# Patient Record
Sex: FEMALE | Race: BLACK OR AFRICAN AMERICAN | NOT HISPANIC OR LATINO | Employment: FULL TIME | URBAN - METROPOLITAN AREA
[De-identification: names, ages, dates, MRNs, and addresses within clinical notes are randomized per-mention and may not be internally consistent; named-entity substitution may affect disease eponyms.]

---

## 2020-07-26 ENCOUNTER — HOSPITAL ENCOUNTER (EMERGENCY)
Facility: OTHER | Age: 35
Discharge: HOME | End: 2020-07-26
Attending: EMERGENCY MEDICINE
Payer: COMMERCIAL

## 2020-07-26 VITALS
RESPIRATION RATE: 18 BRPM | TEMPERATURE: 99 F | BODY MASS INDEX: 37.67 KG/M2 | HEIGHT: 67 IN | DIASTOLIC BLOOD PRESSURE: 87 MMHG | OXYGEN SATURATION: 98 % | WEIGHT: 240 LBS | SYSTOLIC BLOOD PRESSURE: 148 MMHG | HEART RATE: 81 BPM
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
## 2020-07-26 DIAGNOSIS — M79.606 LEG PAIN: ICD-10-CM

## 2020-07-26 DIAGNOSIS — M25.571 RIGHT ANKLE PAIN: Primary | ICD-10-CM

## 2020-07-26 PROCEDURE — 25000003 PHARM REV CODE 250: Performed by: EMERGENCY MEDICINE

## 2020-07-26 PROCEDURE — 99283 EMERGENCY DEPT VISIT LOW MDM: CPT | Mod: 25

## 2020-07-26 RX ORDER — HYDROCODONE BITARTRATE AND ACETAMINOPHEN 5; 325 MG/1; MG/1
1 TABLET ORAL EVERY 4 HOURS PRN
Qty: 15 TABLET | Refills: 0 | Status: SHIPPED | OUTPATIENT
Start: 2020-07-26

## 2020-07-26 RX ORDER — HYDROCODONE BITARTRATE AND ACETAMINOPHEN 5; 325 MG/1; MG/1
1 TABLET ORAL
Status: COMPLETED | OUTPATIENT
Start: 2020-07-26 | End: 2020-07-26

## 2020-07-26 RX ORDER — ACETAMINOPHEN 500 MG
1000 TABLET ORAL
Status: DISCONTINUED | OUTPATIENT
Start: 2020-07-26 | End: 2020-07-26

## 2020-07-26 RX ADMIN — HYDROCODONE BITARTRATE AND ACETAMINOPHEN 1 TABLET: 5; 325 TABLET ORAL at 05:07

## 2020-07-26 NOTE — ED NOTES
Pt to ED via EMS with c/o R knee and ankle pain. Pt reports wrestling with  and fell on leg bending it. Pt tearful. Pt reports R knee and lower shin pain. Pulses present. Full sensation noted. Pt reports unable to bare weight on R leg. Stabilization device on R leg. Pt denies n/v/d, SOB, CP. AAOx4. RR even, unlabored. Pt attached to pulse ox and BP cycled. Will continue to monitor.

## 2020-07-26 NOTE — ED PROVIDER NOTES
Encounter Date: 7/26/2020    SCRIBE #1 NOTE: I, Deepa Desouza, am scribing for, and in the presence of, Dr. Wiseman.       History     Chief Complaint   Patient presents with    Ankle Pain     +R ankle pain after trip and fall while at hotel, denies LOC or head trauma. Mild swelling to R ankle, 2+ pulses, cap refill <3 sec, sensation intact.     Knee Pain     +Pt also reporting R knee pain after fall, no obvious deformity noted. Unable to bear weight to RLE secondary to pain.      Time seen by provider: 5:03 PM    This is a 34 y.o. female who presents with complaint of right ankle pain s/p injury that occurred PTA. Patient states she was wrestling with her  and tripped backwards. She denies hitting her head or LOC. She states that she twisted her right leg during the fall. Patient reports pain from her right knee extending down to her ankle. She states she is unable to bear weight and is currently in an immobilizer. Patient denies taking any OTC medications. Patient denies any numbness or weakness. She denies any other injuries.    The history is provided by the patient.     Review of patient's allergies indicates:  No Known Allergies  History reviewed. No pertinent past medical history.  History reviewed. No pertinent surgical history.  No family history on file.  Social History     Tobacco Use    Smoking status: Never Smoker   Substance Use Topics    Alcohol use: Yes     Comment: socially    Drug use: Never     Review of Systems   Constitutional: Negative for activity change, appetite change, chills, diaphoresis and fever.   HENT: Negative for congestion, sore throat and trouble swallowing.    Eyes: Negative for photophobia and visual disturbance.   Respiratory: Negative for cough, chest tightness and shortness of breath.    Cardiovascular: Negative for chest pain and leg swelling.   Gastrointestinal: Negative for abdominal pain, nausea and vomiting.   Endocrine: Negative for polydipsia and  polyuria.   Genitourinary: Negative for difficulty urinating, dysuria and flank pain.   Musculoskeletal: Positive for arthralgias and gait problem. Negative for back pain and neck pain.        Positive for right leg pain.   Skin: Negative for rash.   Neurological: Negative for dizziness, syncope, weakness, light-headedness, numbness and headaches.   Hematological: Does not bruise/bleed easily.   Psychiatric/Behavioral: Negative for confusion.       Physical Exam     Initial Vitals [07/26/20 1654]   BP Pulse Resp Temp SpO2   137/77 82 14 99 °F (37.2 °C) 98 %      MAP       --         Physical Exam    Nursing note and vitals reviewed.  Constitutional: She appears well-developed. She is cooperative.   HENT:   Head: Atraumatic.   Eyes: Conjunctivae and lids are normal.   Neck: Normal range of motion and phonation normal.   Cardiovascular: Normal rate.   Musculoskeletal: Tenderness present.      Comments: Right lower extremity in temporary splint. ROM not accessed. Tenderness to medial malleolus. Normal distal perfusion.   Neurological: She is alert.   Skin: No rash noted.   Psychiatric: She has a normal mood and affect. Her speech is normal and behavior is normal.         ED Course   Procedures  Labs Reviewed   POCT URINE PREGNANCY          Imaging Results          X-Ray Ankle Complete Right (Final result)  Result time 07/26/20 18:49:57    Final result by Robi Prieto MD (07/26/20 18:49:57)                 Impression:      1. Well corticated irregularity about the distal posterior aspect of the tibia, correlation with any history of prior injury recommended as there is no overlying edema at this time.  Correlation with focal tenderness is advised.      Electronically signed by: Robi Prieto MD  Date:    07/26/2020  Time:    18:49             Narrative:    EXAMINATION:  XR ANKLE COMPLETE 3 VIEW RIGHT    CLINICAL HISTORY:  Pain in right ankle and joints of right foot    TECHNIQUE:  AP, lateral, and oblique images  of the right ankle were performed.    COMPARISON:  None    FINDINGS:  Three views right ankle.    There is a well corticated focus of ossific irregularity along the posterior aspect of the distal tibia, best seen on lateral view.  No overlying edema however correlation with any focal tenderness is recommended.  This may reflect sequela of prior injury.  No dislocation.  The ankle mortise is intact.                               X-Ray Tibia Fibula 2 View Right (Final result)  Result time 07/26/20 18:52:21    Final result by Robi Prieto MD (07/26/20 18:52:21)                 Impression:      1. Details of the distal tibia and fibula are detailed on the ankle radiograph however on current projection, there is again irregularity involving the posterior aspect of the distal tibia.  Portions appear corticated however more acute avulsion injury is not excluded particularly given current positioning as compared to the ankle radiograph.  Correlation with any focal tenderness is recommended.  2. No findings to suggest proximal tibial or fibular fracture.      Electronically signed by: Robi Prieto MD  Date:    07/26/2020  Time:    18:52             Narrative:    EXAMINATION:  XR TIBIA FIBULA 2 VIEW RIGHT    CLINICAL HISTORY:  Pain in leg, unspecified    TECHNIQUE:  AP and lateral views of the right tibia and fibula were performed.    COMPARISON:  None.    FINDINGS:  Two views tibia fibula.    In comparison to the previously performed ankle radiograph, there is cortical irregularity involving the distal posterior aspect of the tibia, on current projection, appears more angulated and may reflect that of acute injury.  This appeared more corticated and chronic appearing on the prior exam, possibly related to positioning.  No acute displaced fracture or dislocation of the proximal tibia or fibula.  The visualized portions of the knee appear intact.                               X-Ray Knee 1 or 2 View Right (Final  result)  Result time 07/26/20 18:39:53    Final result by Robi Prieto MD (07/26/20 18:39:53)                 Impression:      1. No acute displaced fracture or dislocation of the knee.      Electronically signed by: Robi Prieto MD  Date:    07/26/2020  Time:    18:39             Narrative:    EXAMINATION:  XR KNEE 1 OR 2 VIEW RIGHT    CLINICAL HISTORY:  Unspecified fall, initial encounter    TECHNIQUE:  AP and lateral views of the right knee were performed.    COMPARISON:  None    FINDINGS:  Two views right knee.    No acute displaced fracture or dislocation of the knee.  No radiopaque foreign body.  No large knee joint effusion.                              X-Rays:   Independently Interpreted Readings:   Other Readings:  X-Ray  Right Knee: Normal alignment. No obvious fractures or dislocation.  Right Tibia/Fibula: Normal alignment. No obvious fractures or dislocation.  Right ankle: Normal alignment. No obvious fractures or dislocation.    Medical Decision Making:   History:   Old Medical Records: I decided to obtain old medical records.  Initial Assessment:   Urgent evaluation of 34-year-old female here with pain to the right ankle status post fall.  Patient reports she was wrestling with her , when her knee was fold beneath her causing pain to the lower leg.  Patient is not take medication prior to arrival.  Patient is in splint by EMS.  Patient has normal distal neurovascular exam, range of motion not assessed.  No proximal tibial tenderness.  Will plan to obtain imaging and reassess.  Independently Interpreted Test(s):   I have ordered and independently interpreted X-rays - see prior notes.  Clinical Tests:   Radiological Study: Ordered and Reviewed  ED Management:  Small irregularity to distal tibia- given overlying tenderness will opt to immobilize and treat as fx. Pt placed in boot, educated regarding non weigh bearing on crutches until seen by Ortho, RICE education discussed.              Scribe Attestation:   Scribe #1: I performed the above scribed service and the documentation accurately describes the services I performed. I attest to the accuracy of the note.    Attending Attestation:           Physician Attestation for Scribe:  Physician Attestation Statement for Scribe #1: I, Dr. Wiseman, reviewed documentation, as scribed by Deepa Desouza in my presence, and it is both accurate and complete.                               Clinical Impression:     1. Right ankle pain    2. Leg pain    3. Fall            Disposition:   Disposition: Discharged  Condition: Fair     ED Disposition Condition    Discharge Stable        ED Prescriptions     Medication Sig Dispense Start Date End Date Auth. Provider    HYDROcodone-acetaminophen (NORCO) 5-325 mg per tablet Take 1 tablet by mouth every 4 (four) hours as needed for Pain. 15 tablet 7/26/2020  Em Wiseman MD        Follow-up Information     Follow up With Specialties Details Why Contact Info    [facility] Orth[facility] Surgery Go in 1 week  [address] [address]  [phone]                                       Em Wiseman MD  07/26/20 2029

## 2021-06-07 ENCOUNTER — HOSPITAL ENCOUNTER (OUTPATIENT)
Dept: HOSPITAL 5 - TRG | Age: 36
LOS: 1 days | Discharge: HOME | End: 2021-06-08
Attending: OBSTETRICS & GYNECOLOGY
Payer: COMMERCIAL

## 2021-06-07 VITALS — SYSTOLIC BLOOD PRESSURE: 118 MMHG | DIASTOLIC BLOOD PRESSURE: 64 MMHG

## 2021-06-07 DIAGNOSIS — Z3A.32: ICD-10-CM

## 2021-06-07 PROCEDURE — 59025 FETAL NON-STRESS TEST: CPT

## 2021-06-07 PROCEDURE — 96360 HYDRATION IV INFUSION INIT: CPT

## 2021-06-07 PROCEDURE — 76815 OB US LIMITED FETUS(S): CPT

## 2021-06-07 PROCEDURE — 96361 HYDRATE IV INFUSION ADD-ON: CPT

## 2021-06-07 PROCEDURE — 76819 FETAL BIOPHYS PROFIL W/O NST: CPT

## 2021-06-07 PROCEDURE — 81001 URINALYSIS AUTO W/SCOPE: CPT

## 2021-06-08 LAB
BILIRUB UR QL STRIP: (no result)
BLOOD UR QL VISUAL: (no result)
MUCOUS THREADS #/AREA URNS HPF: (no result) /HPF
PH UR STRIP: 6 [PH] (ref 5–7)
RBC #/AREA URNS HPF: 3 /HPF (ref 0–6)
UROBILINOGEN UR-MCNC: 2 MG/DL (ref ?–2)
WBC #/AREA URNS HPF: 1 /HPF (ref 0–6)

## 2021-06-08 NOTE — ULTRASOUND REPORT
ULTRASOUND OBSTETRIC LIMITED 

ULTRASOUND FETAL BIOPHYSICAL PROFILE



INDICATION / CLINICAL INFORMATION: fetal well being.

Clinical Gestational Age (GA) in weeks, days: 32 weeks 6 days 



TECHNIQUE: Transabdominal.



COMPARISON: None available.



FINDINGS:



FETAL BREATHING MOVEMENT = 2

GROSS BODY MOVEMENT = 2

FETAL TONE = 2

QUALITATIVE AMNIOTIC FLUID VOLUME = 2



TOTAL BIOPHYSICAL SCORE = 8/8



FETAL HEART RATE (beats per minute): 147 



AMNIOTIC FLUID INDEX (cm) =  20.9   (normal = 7-24 cm)

PRESENTATION: Cephalic. 



ADDITIONAL FINDINGS: Placenta is located posterior/fundal without previa.



IMPRESSION:

1. Fetal Biophysical Score = 8/8 

2. Single viable IUP in a cephalic presentation with normal NICOLE.



Signer Name: Kelley Aguirre MD 

Signed: 6/8/2021 12:58 AM

Workstation Name: [id number]

## 2021-06-08 NOTE — ULTRASOUND REPORT
ULTRASOUND OBSTETRIC LIMITED 

ULTRASOUND FETAL BIOPHYSICAL PROFILE



INDICATION / CLINICAL INFORMATION: fetal well being.

Clinical Gestational Age (GA) in weeks, days: 32 weeks 6 days 



TECHNIQUE: Transabdominal.



COMPARISON: None available.



FINDINGS:



FETAL BREATHING MOVEMENT = 2

GROSS BODY MOVEMENT = 2

FETAL TONE = 2

QUALITATIVE AMNIOTIC FLUID VOLUME = 2



TOTAL BIOPHYSICAL SCORE = 8/8



FETAL HEART RATE (beats per minute): 147 



AMNIOTIC FLUID INDEX (cm) =  20.9   (normal = 7-24 cm)

PRESENTATION: Cephalic. 



ADDITIONAL FINDINGS: Placenta is located posterior/fundal without previa.



IMPRESSION:

1. Fetal Biophysical Score = 8/8 

2. Single viable IUP in a cephalic presentation with normal NICOLE.



Signer Name: Kelley Aguirre MD 

Signed: 6/8/2021 12:58 AM

Workstation Name: [id number]

## 2021-07-20 ENCOUNTER — HOSPITAL ENCOUNTER (INPATIENT)
Dept: HOSPITAL 5 - LD | Age: 36
LOS: 3 days | Discharge: HOME | End: 2021-07-23
Attending: OBSTETRICS & GYNECOLOGY | Admitting: OBSTETRICS & GYNECOLOGY
Payer: COMMERCIAL

## 2021-07-20 DIAGNOSIS — Z85.3: ICD-10-CM

## 2021-07-20 DIAGNOSIS — K21.9: ICD-10-CM

## 2021-07-20 DIAGNOSIS — D64.9: ICD-10-CM

## 2021-07-20 DIAGNOSIS — O40.3XX0: ICD-10-CM

## 2021-07-20 DIAGNOSIS — Z83.3: ICD-10-CM

## 2021-07-20 DIAGNOSIS — Z3A.38: ICD-10-CM

## 2021-07-20 DIAGNOSIS — Z20.822: ICD-10-CM

## 2021-07-20 DIAGNOSIS — Z86.19: ICD-10-CM

## 2021-07-20 LAB
HCT VFR BLD CALC: 30.9 % (ref 30.3–42.9)
HGB BLD-MCNC: 9.6 GM/DL (ref 10.1–14.3)
MCHC RBC AUTO-ENTMCNC: 31 % (ref 30–34)
MCV RBC AUTO: 75 FL (ref 79–97)
PLATELET # BLD: 461 K/MM3 (ref 140–440)
RBC # BLD AUTO: 4.14 M/MM3 (ref 3.65–5.03)

## 2021-07-20 PROCEDURE — 85027 COMPLETE CBC AUTOMATED: CPT

## 2021-07-20 PROCEDURE — 86901 BLOOD TYPING SEROLOGIC RH(D): CPT

## 2021-07-20 PROCEDURE — 85014 HEMATOCRIT: CPT

## 2021-07-20 PROCEDURE — 85018 HEMOGLOBIN: CPT

## 2021-07-20 PROCEDURE — 86900 BLOOD TYPING SEROLOGIC ABO: CPT

## 2021-07-20 PROCEDURE — U0003 INFECTIOUS AGENT DETECTION BY NUCLEIC ACID (DNA OR RNA); SEVERE ACUTE RESPIRATORY SYNDROME CORONAVIRUS 2 (SARS-COV-2) (CORONAVIRUS DISEASE [COVID-19]), AMPLIFIED PROBE TECHNIQUE, MAKING USE OF HIGH THROUGHPUT TECHNOLOGIES AS DESCRIBED BY CMS-2020-01-R: HCPCS

## 2021-07-20 PROCEDURE — 86592 SYPHILIS TEST NON-TREP QUAL: CPT

## 2021-07-20 PROCEDURE — 86850 RBC ANTIBODY SCREEN: CPT

## 2021-07-20 PROCEDURE — 36415 COLL VENOUS BLD VENIPUNCTURE: CPT

## 2021-07-20 NOTE — HISTORY AND PHYSICAL REPORT
History of Present Illness


Date of examination: 21


Date of admission: 


21 20:38





Chief complaint: 





38 weeks iup with polyhydramnios.here for ind;uction of labor.


History of present illness: 





Patient is a 35-year-old -American female  6 para 3-0-2-3 female. 

Last menstrual period 2020 EDC 2021 her pregnancy was shown

that she was referred to a PA because of advanced maternal age.  Also that she 

has a BMI of 38.888 admitted overnight 39 weeks.  A PA.  The patient desires 

permanent sterilization.  She signed tubal consent for 1 hour GTT was 79 3-hour 

GTT was within normal limits hemoglobin A1c was 6.0.  At bedtime the she was 

started Valtrex prophylaxis at 36 weeks she has polyhydramnios diagnosed by a 

534.19 on 2021 her thyroid-stimulating hormone was 0.276.  Patient had 

7 prenatal visits at the office.  Her chlamydia and gonorrhea tests were 

negative group B strep test was also negative she has no allergies no surgery.





Past medical history was positive for osteoarthrosis and breast cancer and 

hepatitis C





Herpes 2 she has had 2 abortions elective in the past.  Catamenia was 1227.  

This is her sixth pregnancy she is asked to induced abortions 3 full-term 3 

living children her ultrasound was done in the first trimester.  And also 

repeated in the second trimester.





The patient is admitted for induction of labor for polyhydramnios at 38+ weeks 

pregnancy for APA.





Past History


Past Medical History: no pertinent history


Past Surgical History: other (2 ABORTIONS ELECTIVE)


Family/Genetic History: other (Osteoarthrosis breast cancer diabetes type 2 and 

hepatitis C.)





- Obstetrical History


Expected Date of Delivery: 21


Actual Gestation: 38 Week(s) 6 Day(s) 


: 6


Para: 3


Hx # Term Pregnancies: 3


Number of  Pregnancies: 0


Spontaneous Abortions: 0


Induced : 3


Number of Living Children: 3





Medications and Allergies


                                    Allergies











Allergy/AdvReac Type Severity Reaction Status Date / Time


 


No Known Allergies Allergy   Verified 09/06/15 22:05











                                Home Medications











 Medication  Instructions  Recorded  Confirmed  Last Taken  Type


 


No Known Home Medications [No  09/08/15 06/07/21 Unknown History





Reported Home Medications]     











Active Meds: 


Active Medications





Acetaminophen (Acetaminophen 325 Mg Tab)  650 mg PO Q4H PRN


   PRN Reason: Pain, Mild (1-3)


Butorphanol Tartrate (Butorphanol 2 Mg/1 Ml Inj)  2 mg IV Q2H PRN


   PRN Reason: Pain , Severe (7-10)


Butorphanol Tartrate (Butorphanol 2 Mg/1 Ml Inj)  1 mg IV Q2H PRN


   PRN Reason: Pain, Moderate(4-6) LABOR PAIN


Carboprost Tromethamine (Carboprost Tromethamine 250 Mcg/1 Ml Inj)  250 mcg IM 

ONCE PRN


   PRN Reason: Uterine Bleeding


Ephedrine Sulfate (Ephedrine Sulfate 50 Mg/1 Ml Inj)  10 mg IV Q2M PRN


   PRN Reason: Hypotension


Fentanyl (Fentanyl 100 Mcg/2 Ml Inj)  100 mcg IV Q2H PRN


   PRN Reason: Pain,Severe (7-10) LABOR PAIN


Oxytocin/Sodium Chloride (Pitocin/Ns 30 Unit/500ml)  30 units in 500 mls @ 2 

mls/hr IV TITR NIKKI; Protocol


   Last Admin: 21 22:46 Dose:  2 ml/hr, 2 mls/hr


   Documented by: 


Lactated Ringer's (Lactated Ringers)  1,000 mls @ 125 mls/hr IV AS DIRECT NIKKI


Oxytocin/Sodium Chloride (Pitocin/Ns 30 Unit/500ml)  30 units in 500 mls @ 40 

mls/hr IV TITR NIKKI; Protocol


Loperamide HCl (Loperamide 2 Mg Cap)  2 mg PO ONCE PRN


   PRN Reason: give with Hemabate


Methylergonovine Maleate (Methylergonovine Maleate 0.2 Mg/Ml Vial)  0.2 mg IM 

ONCE PRN


   PRN Reason: Uterine Bleeding


Mineral Oil (Mineral Oil 30 Ml Oral Liqd)  30 ml PO QHS PRN


   PRN Reason: Constipation


Misoprostol (Misoprostol 200 Mcg Tab)  800 mcg [place] ONCE PRN


   PRN Reason: Uterine Bleeding


Oxytocin (Oxytocin 10 Unit/1 Ml Inj)  10 unit IM ONCE PRN


   PRN Reason: Uterine Bleeding


Terbutaline Sulfate (Terbutaline 1 Mg/1 Ml Inj)  0.25 mg SUB-Q ONCE PRN


   PRN Reason: Hyperstimulation/Hypertonicity











Review of Systems


All systems: negative





- Vital Signs


Vital signs: 


                                   Vital Signs











Temp Resp


 


 97.9 F   12 


 


 21 21:09  21 21:09








                                        











Temp Pulse Resp BP Pulse Ox


 


 97.9 F      /21 21:09     21 21:09      














- Physical Exam


Breasts: 


Cardiovascular: Regular rate, Normal S1, Normal S2


Abdomen: Positive: normal appearance, soft, normal bowel sounds.  Negative: 

distention, tenderness


Vulva: both: normal


Vagina: Positive: normal moisture.  Negative: discharge


Cervix: Negative: lesion, discharge


Uterus: Positive: normal size, enlarged (TERM SIZE), normal contour


Adnexa: both: normal


Anus/Rectum: Positive: normal perianal skin, heme negative.  Negative: rectal 

mass, hemorrhoids


Extremities: 


Deep Tendon Reflex Grade: Normal +2





- Obstetrical


FHR: category 1


Uterine Contraction Monitor Mode: External


Cervical Dilatation: 3


Cervical Effacement Percentage: 70


Fetal station: -3


Uterine Contraction Frequency (min): Q5M


Uterine Contraction Pattern: Irregular





Results


All other labs normal.








Assessment and Plan





38 WEEKS IUP,POLYHYDRAMNIOS


PLAN INDUCTION WITH PITOCIN.

## 2021-07-21 LAB
HCT VFR BLD CALC: 27.7 % (ref 30.3–42.9)
HGB BLD-MCNC: 8.6 GM/DL (ref 10.1–14.3)

## 2021-07-21 PROCEDURE — 00HU33Z INSERTION OF INFUSION DEVICE INTO SPINAL CANAL, PERCUTANEOUS APPROACH: ICD-10-PCS | Performed by: OBSTETRICS & GYNECOLOGY

## 2021-07-21 PROCEDURE — 3E0R3BZ INTRODUCTION OF ANESTHETIC AGENT INTO SPINAL CANAL, PERCUTANEOUS APPROACH: ICD-10-PCS | Performed by: OBSTETRICS & GYNECOLOGY

## 2021-07-21 RX ADMIN — ACETAMINOPHEN PRN MG: 325 TABLET ORAL at 17:40

## 2021-07-21 RX ADMIN — ACETAMINOPHEN PRN MG: 325 TABLET ORAL at 12:11

## 2021-07-21 RX ADMIN — OXYTOCIN SCH MLS/HR: 10 INJECTION, SOLUTION INTRAMUSCULAR; INTRAVENOUS at 00:15

## 2021-07-21 RX ADMIN — IBUPROFEN SCH MG: 600 TABLET, FILM COATED ORAL at 15:23

## 2021-07-21 RX ADMIN — IBUPROFEN SCH MG: 600 TABLET, FILM COATED ORAL at 20:32

## 2021-07-21 RX ADMIN — ACETAMINOPHEN PRN MG: 325 TABLET ORAL at 22:53

## 2021-07-21 RX ADMIN — OXYTOCIN SCH MLS/HR: 10 INJECTION, SOLUTION INTRAMUSCULAR; INTRAVENOUS at 09:44

## 2021-07-21 RX ADMIN — IBUPROFEN SCH: 600 TABLET, FILM COATED ORAL at 17:11

## 2021-07-21 NOTE — ANESTHESIA CONSULTATION
Anesthesia Consult and Med Hx


Date of service: 07/21/21





- Airway


Anesthetic Teeth Evaluation: Good


ROM Head & Neck: Adequate


Mental/Hyoid Distance: Adequate


Mallampati Class: Class III


Intubation Access Assessment: Possibly Difficult





- Pulmonary Exam


CTA: Yes





- Cardiac Exam


Cardiac Exam: RRR





- Pre-Operative Health Status


ASA Pre-Surgery Classification: ASA3


Proposed Anesthetic Plan: Epidural





- Pulmonary


Hx Smoking: No


Hx Asthma: No


Hx Respiratory Symptoms: No


SOB: No


COPD: No


Home Oxygen Therapy: No


Hx Pneumonia: No


Hx Sleep Apnea: No





- Cardiovascular System


Hx Hypertension: No


Hx Coronary Artery Disease: No


Hx Heart Attack/AMI: No


Hx Angina: No


Hx Percutaneous Transluminal Coronary Angioplasty (PTCA): No


Hx Cardia Arrhythmia: No


Hx Pacemaker: No


Hx Internal Defibrillator: No


Hx Valvular Heart Disease: No


Hx Heart Murmur: No


Hx Peripheral Vascular Disease: No





- Central Nervous System


Hx Neuromuscular Disorder: No


Hx Seizures: No


CVA: No


Hx Back Pain: Yes


Hx Psychiatric Problems: No





- Gastrointestinal


Hx Ulcer: No


Hx Gastroesophageal Reflux Disease: Yes





- Endocrine


Hx Renal Disease: No


Hx End Stage Renal Disease: No


Hx Cirrhosis: No


Hx Liver Disease: No


Hx Insulin Dependent Diabetes: No


Hx Non-Insulin Dependent Diabetes: No


Hx Thyroid Disease: No


Hx Hypothyroidism: No


Hx Hyperthyroidism: No





- Hematic


Hx Anemia: No


Hx Sickle Cell Disease: No





- Other Systems


Hx Alcohol Use: Yes


Hx Substance Use: No


Hx Cancer: No


Hx Obesity: Yes

## 2021-07-21 NOTE — PROGRESS NOTE
Labor Epidural





- Labor Epidural


Start Time: 03:40


Stop Time: 03:51


Performed by:: JACQUES GLORIA


Procedure: 





Patient is requesting a laboring epidural for laboring pain.  Patient IDed, H&P 

reviewed, all questions and concerns were answered, and consent was signed. 

Timeout was performed at bedside.  Patient in sitting position.  Sterile prep 

and drape was performed.  [3] ml of 1% lidocaine skin wheal at L[3]- L [4].  18-

gauge Tuohy epidural needle was advanced to loss of resistance with saline 

technique 9cm x1 attempt.  Negative CSF negative blood.  Epidural catheter 

advanced to [12] centimeters.  [NEGATIVE] Aspiration [NEGATIVE] test dose.  

Sterile dressing applied.  Patient tolerated procedure.

## 2021-07-21 NOTE — PROCEDURE NOTE
Date of procedure: 21


Pre-op diagnosis: 39 wks iup,polyhydramnios


Post-op diagnosis: same


Procedure: 





,LIVE BORN FEMALE ,WGT 8'11', APGAR 8,9. NO TEARS. PLACENTA SPONTANEOUS, EBL

100CCS.


Anesthesia: epidural


Surgeon: CONSTANTINO MEDRAON


Estimated blood loss: 50-100ml


Pathology: none


Specimen disposition: discarded


Condition: stable


Disposition: PACU

## 2021-07-22 RX ADMIN — IBUPROFEN SCH: 600 TABLET, FILM COATED ORAL at 12:33

## 2021-07-22 RX ADMIN — IBUPROFEN SCH: 600 TABLET, FILM COATED ORAL at 16:53

## 2021-07-22 RX ADMIN — ACETAMINOPHEN PRN MG: 325 TABLET ORAL at 09:23

## 2021-07-22 RX ADMIN — IBUPROFEN SCH MG: 600 TABLET, FILM COATED ORAL at 20:53

## 2021-07-22 RX ADMIN — ACETAMINOPHEN PRN MG: 325 TABLET ORAL at 16:56

## 2021-07-22 RX ADMIN — IBUPROFEN SCH MG: 600 TABLET, FILM COATED ORAL at 12:36

## 2021-07-22 RX ADMIN — FERROUS SULFATE TAB 325 MG (65 MG ELEMENTAL FE) SCH MG: 325 (65 FE) TAB at 09:24

## 2021-07-22 RX ADMIN — IBUPROFEN SCH MG: 600 TABLET, FILM COATED ORAL at 06:11

## 2021-07-22 NOTE — PROGRESS NOTE
Assessment and Plan





A: S/P 


    Asymptomatic anemia





p: Continue routine pp care


    Fe prescribed


    Advised foods high in Fe


    D/C home tomm if stable





Subjective





- Subjective


Date of service: 21


Principal diagnosis: s/p 


Patient reports: appetite normal, voiding normally, pain well controlled, 

ambulating normally, other (Denies weakness, sob or dizziness)


[place]: doing well, bottle feeding





Objective





- Vital Signs


Latest vital signs: 


                                   Vital Signs











  Temp Pulse Resp BP BP Pulse Ox


 


 21 07:51  98.1 F  79  20  114/68   97


 


 21 07:02    18   


 


 21 06:11    18   


 


 21 23:53    18   


 


 21 23:19  98.5 F  74  18  105/63   99


 


 21 22:53    18   


 


 21 21:32    18   


 


 21 20:32    18   


 


 21 14:30  98.1 F  83  18   120/70 


 


 21 14:03   83     99


 


 21 13:58   72     98


 


 21 13:53   84     99


 


 21 13:48   71     100


 


 21 13:43   74     98


 


 21 13:38   81     97


 


 21 13:33   91 H     95


 


 21 13:31   84     94


 


 21 13:28   72     96


 


 21 13:25   77     94


 


 21 13:23   80     97


 


 21 13:20   79     90


 


 21 13:18   73     98


 


 21 13:14   74   117/56  


 


 21 13:13   83     98


 


 21 13:08   72     99


 


 21 13:03   73     99


 


 21 12:58   76     97


 


 21 12:53   73     98


 


 21 12:48   67     98


 


 21 12:43   74     99


 


 21 12:38   73     97


 


 21 12:33   70     98


 


 21 12:28   79     97


 


 21 12:23   72     98


 


 21 12:18   75     98


 


 21 12:14   80   117/74  


 


 21 12:13   65     98


 


 21 12:11    22   


 


 21 12:08   68     98


 


 21 12:03   69     98


 


 21 11:58   67     99


 


 21 11:53   66     98


 


 21 11:48   73     99


 


 21 11:43   65     99


 


 21 11:41   68     94


 


 21 11:38   70     96


 


 21 11:33   80     97


 


 21 11:28   78     98


 


 21 11:23   75     91


 


 21 11:18   73     99


 


 21 11:14   76   127/73  


 


 21 11:13   74     98


 


 21 11:08   73     98


 


 21 11:03   76     99


 


 21 10:58   74     99


 


 21 10:53   73     99


 


 21 10:48   80     99


 


 21 10:43   73     98


 


 21 10:38   74     98


 


 21 10:33   76     99


 


 21 10:32   78   128/73  


 


 21 10:28   71     99


 


 21 10:23   70     100


 


 21 10:18   71     100


 


 21 10:13   70   146/87   100


 


 21 10:08   72     100


 


 21 10:03   72     100


 


 21 09:58   76     100








                                Intake and Output











 21





 22:59 06:59 14:59


 


Intake Total 240 240 


 


Output Total 450  


 


Balance -210 240 


 


Intake:   


 


  Oral 240  


 


  Intake, Free Water  240 


 


Output:   


 


  Urine 450  


 


    Void 450  


 


Other:   


 


  Total, Intake Amount 240  


 


  Total, Output Amount 450  


 


  # Voids   


 


    Void 1 2 














- Exam


Breasts: Present: normal


Abdomen: Present: normal appearance, soft, normal bowel sounds


Vulva: both: normal


Uterus: Present: normal, firm


Extremities: Present: normal





- Labs


Labs: 


                              Abnormal lab results











  21 Range/Units





  19:05 


 


Hgb  8.6 L  (10.1-14.3)  gm/dl


 


Hct  27.7 L  (30.3-42.9)  %

## 2021-07-23 VITALS — SYSTOLIC BLOOD PRESSURE: 127 MMHG | DIASTOLIC BLOOD PRESSURE: 69 MMHG

## 2021-07-23 RX ADMIN — HYDROCODONE BITARTRATE AND ACETAMINOPHEN PRN EACH: 5; 325 TABLET ORAL at 08:45

## 2021-07-23 RX ADMIN — HYDROCODONE BITARTRATE AND ACETAMINOPHEN PRN EACH: 5; 325 TABLET ORAL at 00:56

## 2021-07-23 RX ADMIN — FERROUS SULFATE TAB 325 MG (65 MG ELEMENTAL FE) SCH MG: 325 (65 FE) TAB at 08:44

## 2021-07-23 NOTE — PROGRESS NOTE
Assessment and Plan


A: Postpartum day 2 S/P . 


Anemia.


P: Discharge patient home today. Discussed with patient postpartum discharge 

instructions and warning signs. Advised patient to continue taking her prenatal 

vitamin and iron supplement at home. Advised patient to avoid intercourse, 

lifting, and heavy housework for 6-8 weeks. Advised patient to follow up at [facility] in 6 weeks. Patient voiced understanding of all instructions. 








Subjective





- Subjective


Date of service: 21


Principal diagnosis: Postpartum day 2 S/P 


Interval history: 


Doing well; desires discharge home today.





Patient reports: appetite normal, voiding normally, pain well controlled, 

flatus, ambulating normally, no dizzy ambulation, no nauseated


: doing well, bottle feeding





Objective





- Vital Signs


Latest vital signs: 


                                   Vital Signs











  Temp Pulse Resp BP Pulse Ox


 


 21 08:44  97.6 F  81  18  133/72  98


 


 21 01:33  98.0 F  75  16  117/69  99


 


 21 15:35  97.8 F  77  16  117/61  96








                                Intake and Output











 21





 23:59 07:59 15:59


 


Intake Total 600 360 


 


Balance 600 360 


 


Intake:   


 


  Oral 600  


 


  Intake, Free Water  360 


 


Other:   


 


  Total, Intake Amount 240  


 


  # Voids   


 


    Void 1 2 














- Exam


Cardiovascular: Present: Regular rate


Lungs: Present: Clear to auscultation


Abdomen: Present: normal appearance, soft.  Absent: distention, tenderness, 

guarding, rigidity


Uterus: Present: normal, firm, fundal height below umbilicus.  Absent: 

bogginess, tenderness


Extremities: Absent: tenderness, edema

## 2021-07-23 NOTE — DISCHARGE SUMMARY
Providers





- Providers


Date of Admission: 


21 20:38





Date of discharge: 21


Attending physician: 


CONSTANTINO MEDRANO MD





Primary care physician: 


CONSTANTINO MEDRANO MD








Hospitalization


Reason for admission: induction of labor


Delivery: 


Episiotomy: none


Laceration: none


Other postpartum procedures: none


Postpartum complications: none


Discharge diagnosis: IUP at term delivered


 baby: female


Pertinent studies: 


Labs





Hospital course: 


Normal hospital course





Condition at discharge: Good


Disposition: DC-01 TO HOME OR SELFCARE





- Discharge Diagnoses


(1) Term pregnancy delivered


Status: Acute   





(2) Anemia


Status: Acute   





Plan





- Provider Discharge Summary


Activity: routine, no sex for 6 weeks, no heavy lifting 4 weeks, no strenuous 

exercise


Diet: routine


Instructions: routine


Additional instructions: 


Continue taking your prenatal vitamins and iron supplements at home.


Follow up at [facility] office in 6 weeks. 


Call your doctor immediately for:


* Fever > 100.5


* Heavy vaginal bleeding ( >1 pad per hour)


* Severe persistent headache


* Shortness of breath


* Reddened, hot, painful area to leg or breast











- Follow up plan


Follow up: 


CONSTANTINO MEDRANO MD [Primary Care Provider] - 6 Weeks


Forms:  [facility] Discharge Summary, Discharge Signature Page

## 2021-12-06 ENCOUNTER — HOSPITAL ENCOUNTER (EMERGENCY)
Dept: HOSPITAL 5 - ED | Age: 36
Discharge: HOME | End: 2021-12-06
Payer: COMMERCIAL

## 2021-12-06 VITALS — DIASTOLIC BLOOD PRESSURE: 83 MMHG | SYSTOLIC BLOOD PRESSURE: 142 MMHG

## 2021-12-06 DIAGNOSIS — F17.200: ICD-10-CM

## 2021-12-06 DIAGNOSIS — K04.7: Primary | ICD-10-CM

## 2021-12-06 DIAGNOSIS — K05.00: ICD-10-CM

## 2021-12-06 DIAGNOSIS — K02.9: ICD-10-CM

## 2021-12-06 DIAGNOSIS — K21.9: ICD-10-CM

## 2021-12-06 PROCEDURE — 96372 THER/PROPH/DIAG INJ SC/IM: CPT

## 2021-12-06 PROCEDURE — 99282 EMERGENCY DEPT VISIT SF MDM: CPT

## 2021-12-06 NOTE — EMERGENCY DEPARTMENT REPORT
ED General Adult HPI





- General


Chief complaint: Dental/Oral


Stated complaint: RT TOOTH PAIN


PUI?: No


Source: patient


Mode of arrival: Ambulatory


Limitations: No Limitations





- History of Present Illness


Initial comments: 





Patient is a 36-year-old -American female with a history of GERD who 

presents to the ED with complaint of acute onset persistent severe right 

mandibular gingiva pain and swelling and premolar and molar toothache for the 

last 5 days.  Patient states that she was initially evaluated at another 

hospital and was given a prescription of amoxicillin 500 mg be taken 1 capsule 

every 12 hours.  Patient states that she has been taking Motrin 800 mg and 

Tylenol No. 3 tablets with no relief.  Patient states that in the last 6 hours 

the pain has been excruciatingly worse and that she is unable to sleep because 

of worsening pain.  Patient denies fever, chills, dizziness, syncope, cough, 

sore throat, traumatic injury or fall, nausea and vomiting or diarrhea and 

abdominal pain.


MD Complaint: right mandibular gingival swelling and pain; premolar and molar 

toothache


-: Sudden, days(s) (5)


Location: mouth


Severity scale (0 -10): 10


Quality: aching, sharp


Consistency: constant


Improves with: none


Worsens with: eating


Associated Symptoms: denies other symptoms, headaches.  denies: confusion, chest

pain, cough, diaphoresis, fever/chills, loss of appetite, malaise, 

nausea/vomiting, rash, shortness of breath, syncope, weakness


Treatments Prior to Arrival: NSAID





- Related Data


                                  Previous Rx's











 Medication  Instructions  Recorded  Last Taken  Type


 


Clindamycin [Clindamycin CAP] 300 mg PO Q8HR #60 capsule 12/06/21 Unknown Rx


 


Ketorolac [Toradol] 10 mg PO Q8H PRN #20 tablet 12/06/21 Unknown Rx











                                    Allergies











Allergy/AdvReac Type Severity Reaction Status Date / Time


 


No Known Allergies Allergy   Verified 12/03/21 16:31














ED Review of Systems


ROS: 


Stated complaint: RT TOOTH PAIN


Other details as noted in HPI





Constitutional: denies: chills, fever


Eyes: denies: eye pain, eye discharge, vision change


ENT: dental pain (Right mandibular premolar and molar toothache; painful swollen

right mandibular gingiva).  denies: ear pain, throat pain


Respiratory: denies: cough, shortness of breath, wheezing


Cardiovascular: denies: chest pain, palpitations


Endocrine: no symptoms reported


Gastrointestinal: denies: abdominal pain, nausea, vomiting, diarrhea


Genitourinary: denies: urgency, dysuria, discharge


Musculoskeletal: denies: back pain, joint swelling, arthralgia


Skin: denies: rash, lesions


Neurological: headache.  denies: weakness, paresthesias


Psychiatric: denies: anxiety, depression


Hematological/Lymphatic: denies: easy bleeding, easy bruising





ED Past Medical Hx





- Past Medical History


Previous Medical History?: Yes


Hx Hypertension: No


Hx Heart Attack/AMI: No


Hx Congestive Heart Failure: No


Hx Diabetes: No


Hx Deep Vein Thrombosis: No


Hx GERD: Yes (Food dependent)


Hx Liver Disease: No


Hx Renal Disease: No


Hx Sickle Cell Disease: No


Hx Seizures: No


Hx Asthma: No


Hx COPD: No


Hx HIV: No





- Surgical History


Past Surgical History?: No


Hx Pacemaker: No


Hx Internal Defibrillator: No





- Social History


Smoking Status: Current Every Day Smoker





- Medications


Home Medications: 


                                Home Medications











 Medication  Instructions  Recorded  Confirmed  Last Taken  Type


 


Clindamycin [Clindamycin CAP] 300 mg PO Q8HR #60 capsule 12/06/21  Unknown Rx


 


Ketorolac [Toradol] 10 mg PO Q8H PRN #20 tablet 12/06/21  Unknown Rx














ED Physical Exam





- General


Limitations: No Limitations


General appearance: alert, in no apparent distress





- Head


Head exam: Present: atraumatic, normocephalic, normal inspection





- Eye


Eye exam: Present: normal appearance, PERRL, EOMI


Pupils: Present: normal accommodation





- ENT


ENT exam: Present: mucous membranes moist, TM's normal bilaterally, normal 

external ear exam, other (Swollen, severely tender right mandibular gingiva; 

severely tender right mandibular premolar and molar teeth tenderness)





- Neck


Neck exam: Present: normal inspection, full ROM





- Respiratory


Respiratory exam: Present: normal lung sounds bilaterally.  Absent: respiratory 

distress, wheezes, rales, rhonchi, chest wall tenderness, accessory muscle use, 

decreased breath sounds, prolonged expiratory





- Cardiovascular


Cardiovascular Exam: Present: regular rate, normal rhythm, normal heart sounds. 

 Absent: systolic murmur, diastolic murmur, rubs, gallop





- GI/Abdominal


GI/Abdominal exam: Present: soft, normal bowel sounds.  Absent: tenderness, 

guarding, rebound, hyperactive bowel sounds, hypoactive bowel sounds, 

organomegaly, mass





- Extremities Exam


Extremities exam: Present: normal inspection, full ROM, normal capillary refill





- Back Exam


Back exam: Present: normal inspection, full ROM.  Absent: tenderness, CVA 

tenderness (R), CVA tenderness (L), muscle spasm, paraspinal tenderness, 

vertebral tenderness





- Neurological Exam


Neurological exam: Present: alert, oriented X3, CN II-XII intact, normal gait, 

reflexes normal





- Psychiatric


Psychiatric exam: Present: normal affect, normal mood





- Skin


Skin exam: Present: warm, dry, intact, normal color.  Absent: rash





ED Course





                                   Vital Signs











  12/06/21





  01:11


 


Temperature 98 F


 


Pulse Rate 78


 


Respiratory 18





Rate 


 


Blood Pressure 142/83





[Right] 


 


O2 Sat by Pulse 99





Oximetry 














ED Medical Decision Making





- Medical Decision Making





This is a 36-year-old -American female with a history of GERD who 

presents to the ED with complaint of acute onset persistent severe right 

mandibular gingiva pain and swelling and premolar and molar toothache for the 

last 5 days.  Patient states that she was initially evaluated at another 

hospital and was given a prescription of amoxicillin 500 mg be taken 1 capsule 

every 12 hours.  Patient states that she has been taking Motrin 800 mg and 

Tylenol No. 3 tablets with no relief.  Patient states that in the last 6 hours 

the pain has been excruciatingly worse and that she is unable to sleep because 

of worsening pain.  In the ED, patient is alert and oriented x3 and is not in 

distress but appears to be in pain.  Patient was treated for pain in the ED and 

observed.  On reevaluation, patient's pain is well controlled medication.  

Patient will discharge home on a new antibiotic clindamycin 300 mg every 8 

hours.  Patient was advised to stop taking amoxicillin that was previously 

prescribed.  Patient was advised to return to the ED immediately if symptoms get

 worse, otherwise follow-up with the dentist in 7 to 10 days for reevaluation.





- Differential Diagnosis


Dental abscess; gingivitis; dental caries


Critical care attestation.: 


If time is entered above; I have spent that time in minutes in the direct care 

of this critically ill patient, excluding procedure time.








ED Disposition


Clinical Impression: 


 Dental abscess, Acute gingivitis, Dental caries





Disposition: 01 HOME / SELF CARE / HOMELESS


Is pt being admited?: No


Does the pt Need Aspirin: No


Condition: Stable


Instructions:  Dental Abscess, Easy-to-Read, Trench Mouth


Additional Instructions: 


Take medication as needed for pain, take the newly prescribed antibiotic as 

advised, follow-up with the dentist in 7 to 10 days for reevaluation.  Return to

 the ED immediately if symptoms get worse.


Prescriptions: 


Clindamycin [Clindamycin CAP] 300 mg PO Q8HR #60 capsule


Ketorolac [Toradol] 10 mg PO Q8H PRN #20 tablet


 PRN Reason: Pain


Referrals: 


[facility] [Outside] - 3-5 Days


Forms:  Work/School Release Form(ED)


Time of Disposition: 02:08


Print Language: ENGLISH